# Patient Record
Sex: FEMALE | Race: OTHER | Employment: FULL TIME | URBAN - METROPOLITAN AREA
[De-identification: names, ages, dates, MRNs, and addresses within clinical notes are randomized per-mention and may not be internally consistent; named-entity substitution may affect disease eponyms.]

---

## 2017-03-28 PROBLEM — R01.1 CARDIAC MURMUR: Status: ACTIVE | Noted: 2017-03-28

## 2017-03-28 PROBLEM — J30.9 ALLERGIC RHINITIS: Status: ACTIVE | Noted: 2017-03-28

## 2017-03-28 PROBLEM — Z80.41 FAMILY HISTORY OF MALIGNANT NEOPLASM OF OVARY: Status: ACTIVE | Noted: 2017-03-28

## 2017-03-28 PROBLEM — I10 BENIGN ESSENTIAL HYPERTENSION: Status: ACTIVE | Noted: 2017-03-28

## 2017-03-28 PROBLEM — Z80.3 FAMILY HISTORY OF MALIGNANT NEOPLASM OF BREAST: Status: ACTIVE | Noted: 2017-03-28

## 2017-03-28 PROBLEM — D64.9 ANEMIA: Status: ACTIVE | Noted: 2017-03-28

## 2017-03-28 PROBLEM — J45.909 ASTHMA: Status: ACTIVE | Noted: 2017-03-28

## 2019-10-07 ENCOUNTER — HOSPITAL ENCOUNTER (OUTPATIENT)
Dept: INFUSION THERAPY | Age: 49
Discharge: HOME | End: 2019-10-07
Payer: COMMERCIAL

## 2019-10-07 VITALS
SYSTOLIC BLOOD PRESSURE: 131 MMHG | RESPIRATION RATE: 18 BRPM | DIASTOLIC BLOOD PRESSURE: 82 MMHG | HEART RATE: 78 BPM | TEMPERATURE: 98.2 F | OXYGEN SATURATION: 100 %

## 2019-10-07 PROCEDURE — 96374 THER/PROPH/DIAG INJ IV PUSH: CPT

## 2019-10-07 PROCEDURE — 74011250636 HC RX REV CODE- 250/636: Performed by: OBSTETRICS & GYNECOLOGY

## 2019-10-07 RX ORDER — HYDROCHLOROTHIAZIDE 25 MG/1
25 TABLET ORAL DAILY
COMMUNITY
Start: 2020-01-01

## 2019-10-07 RX ORDER — SODIUM CHLORIDE 0.9 % (FLUSH) 0.9 %
5-10 SYRINGE (ML) INJECTION AS NEEDED
Status: DISCONTINUED | OUTPATIENT
Start: 2019-10-07 | End: 2019-10-11 | Stop reason: HOSPADM

## 2019-10-07 RX ORDER — AMLODIPINE AND OLMESARTAN MEDOXOMIL 5; 40 MG/1; MG/1
5-40 TABLET ORAL DAILY
COMMUNITY
Start: 2020-01-01

## 2019-10-07 RX ADMIN — FERRIC CARBOXYMALTOSE INJECTION 750 MG: 50 INJECTION, SOLUTION INTRAVENOUS at 10:52

## 2019-10-07 RX ADMIN — Medication 10 ML: at 11:08

## 2019-10-07 NOTE — PROGRESS NOTES
[facility] Progress Note Date: 2019 Name: Lauretta Skiff MRN: [id number] : 1970 Injectafer Infusion Ms. Fallon to [facility], ambulatory, at 1000. Pt was assessed and education was provided. Ms. Marco Cerna vitals were reviewed and patient was observed for 5 minutes prior to treatment. Visit Vitals BP (!) 141/92 (BP 1 Location: Left arm, BP Patient Position: Sitting) Pulse 76 Temp 98 °F (36.7 °C) Resp 18 SpO2 100% Breastfeeding? No  
 
 
Patient Vitals for the past 12 hrs: 
 Temp Pulse Resp BP SpO2  
10/07/19 1130 98.2 °F (36.8 °C) 78 18 131/82 100 % 10/07/19 1111 98 °F (36.7 °C) 76 18 (!) 141/92 100 % 10/07/19 1007 98.5 °F (36.9 °C) 88 18 120/83 100 % 24g PIV placed in left hand x 2 attempts by another nursing team member after attempting x1 in right hand. PIV flushed easily and had brisk blood return. Injectafer 750 mg was initiated per IVP with no complications. Patient's vital signs were stable and pt denied complaints of itching, lip/tongue/facial swelling, SOB, CP or other complaints. Ms. Fallon tolerated the infusion, and had no complaints. VS remained stable. PIV flushed with NS 10 ml and removed. No bleeding or hematoma noted at site. Guaze and coban applied. Patient was observed for 30 minutes post treatment with no complaints of any. Reviewed discharge instructions with patient, including expected side effects (abdominal cramping, nausea, changes in color of urine or feces) and signs of allergic reaction requiring medical attention (itching/hives/rashes, SOB, chest pain, lip/tongue/facial swelling). Patient given printed copy to take home. Patient verbalized understanding of discharge instructions. Patient armband was removed and shredded. Ms. Fallon was discharged from [place] [address] in stable condition at 1135. She is to follow up with [facility] on 10/17/19 at 1300 for Injectafer 2of 2.  
 
Kyle Gavin RN 
 October 7, 2019 
12:18 PM

## 2019-10-17 ENCOUNTER — HOSPITAL ENCOUNTER (OUTPATIENT)
Dept: INFUSION THERAPY | Age: 49
Discharge: HOME | End: 2019-10-17
Payer: COMMERCIAL

## 2019-10-17 VITALS
DIASTOLIC BLOOD PRESSURE: 86 MMHG | OXYGEN SATURATION: 98 % | SYSTOLIC BLOOD PRESSURE: 141 MMHG | RESPIRATION RATE: 18 BRPM | HEART RATE: 69 BPM | TEMPERATURE: 98.5 F

## 2019-10-17 PROCEDURE — 74011250636 HC RX REV CODE- 250/636: Performed by: OBSTETRICS & GYNECOLOGY

## 2019-10-17 PROCEDURE — 96374 THER/PROPH/DIAG INJ IV PUSH: CPT

## 2019-10-17 RX ORDER — SODIUM CHLORIDE 0.9 % (FLUSH) 0.9 %
5-10 SYRINGE (ML) INJECTION AS NEEDED
Status: DISCONTINUED | OUTPATIENT
Start: 2019-10-17 | End: 2019-10-21 | Stop reason: HOSPADM

## 2019-10-17 RX ADMIN — FERRIC CARBOXYMALTOSE INJECTION 750 MG: 50 INJECTION, SOLUTION INTRAVENOUS at 13:46

## 2019-10-17 RX ADMIN — Medication 10 ML: at 14:03

## 2019-10-17 NOTE — PROGRESS NOTES
[facility] Progress Note Date: 2019 Name: Lauretta Skiff MRN: [id number] : 1970 Injectafer Infusion Ms. Fallon to [facility], [facility], at 1308. Pt was assessed and education was provided. Ms. Fallon stated she was hospitalized and transfused with 2 units of blood on 10/8/2019 after her first injecafer infusion on 10/7/2019. She states she will be having surgery (hysterectomy) at the end of this month due to heavy bleeding. Patient c/o lower abdominal discomfort and is taking tylenol for pain. Ms. Marco Cerna vitals were reviewed and patient was observed for 5 minutes prior to treatment. Visit Vitals /86 (BP 1 Location: Right arm, BP Patient Position: Sitting) Pulse 69 Temp 98.5 °F (36.9 °C) Resp 18 SpO2 98% Patient Vitals for the past 12 hrs: 
 Temp Pulse Resp BP SpO2  
10/17/19 1408 98.5 °F (36.9 °C) 69 18 141/86 98 % 10/17/19 1332 98.2 °F (36.8 °C) 75 18 (!) 141/94 99 % 24g PIV placed in left wrist x 1 attempt, patient tolerated well. PIV flushed easily and had brisk blood return. Injectafer 750 mg was initiated per IVP with no complications. Patient's vital signs were stable and pt denied complaints of itching, lip/tongue/facial swelling, SOB, CP or other complaints. Ms. Fallon tolerated the infusion, and had no complaints. VS remained stable. PIV flushed with NS 10 ml and removed. No bleeding or hematoma noted at site. Guaze and coban applied. Patient was observed for 30 minutes post treatment with no complaints of any. Reviewed discharge instructions with patient, including expected side effects (abdominal cramping, nausea, changes in color of urine or feces) and signs of allergic reaction requiring medical attention (itching/hives/rashes, SOB, chest pain, lip/tongue/facial swelling). Patient given printed copy to take home. Patient verbalized understanding of discharge instructions. Patient armband was removed and shredded. Ms. Fallon was discharged from [place] [address] in stable condition at 1430. She has no further appointments with [facility] at this time. She is to follow up with Dr. Agustin Quach as instructed. Kyle Gavin RN October 17, 2019 
12:18 PM

## 2020-01-01 ENCOUNTER — HOME CARE VISIT (OUTPATIENT)
Dept: SCHEDULING | Facility: HOME HEALTH | Age: 50
End: 2020-01-01
Payer: COMMERCIAL

## 2020-01-01 ENCOUNTER — HOME CARE VISIT (OUTPATIENT)
Dept: HOSPICE | Facility: HOSPICE | Age: 50
End: 2020-01-01
Payer: COMMERCIAL

## 2020-01-01 ENCOUNTER — HOSPICE ADMISSION (OUTPATIENT)
Dept: HOSPICE | Facility: HOSPICE | Age: 50
End: 2020-01-01
Payer: COMMERCIAL

## 2020-01-01 VITALS
DIASTOLIC BLOOD PRESSURE: 92 MMHG | OXYGEN SATURATION: 100 % | WEIGHT: 98 LBS | HEART RATE: 110 BPM | BODY MASS INDEX: 19.24 KG/M2 | HEIGHT: 60 IN | SYSTOLIC BLOOD PRESSURE: 120 MMHG | RESPIRATION RATE: 16 BRPM

## 2020-01-01 VITALS
RESPIRATION RATE: 16 BRPM | SYSTOLIC BLOOD PRESSURE: 138 MMHG | HEART RATE: 112 BPM | TEMPERATURE: 98.8 F | DIASTOLIC BLOOD PRESSURE: 72 MMHG | OXYGEN SATURATION: 99 %

## 2020-01-01 VITALS
RESPIRATION RATE: 20 BRPM | OXYGEN SATURATION: 99 % | SYSTOLIC BLOOD PRESSURE: 140 MMHG | DIASTOLIC BLOOD PRESSURE: 99 MMHG | HEART RATE: 115 BPM

## 2020-01-01 VITALS
HEART RATE: 116 BPM | RESPIRATION RATE: 18 BRPM | TEMPERATURE: 98.8 F | SYSTOLIC BLOOD PRESSURE: 144 MMHG | DIASTOLIC BLOOD PRESSURE: 88 MMHG | OXYGEN SATURATION: 100 %

## 2020-01-01 VITALS
OXYGEN SATURATION: 100 % | SYSTOLIC BLOOD PRESSURE: 140 MMHG | HEART RATE: 99 BPM | DIASTOLIC BLOOD PRESSURE: 90 MMHG | TEMPERATURE: 97.5 F

## 2020-01-01 VITALS
OXYGEN SATURATION: 100 % | TEMPERATURE: 98.2 F | SYSTOLIC BLOOD PRESSURE: 138 MMHG | RESPIRATION RATE: 16 BRPM | HEART RATE: 94 BPM | DIASTOLIC BLOOD PRESSURE: 88 MMHG

## 2020-01-01 PROCEDURE — G0299 HHS/HOSPICE OF RN EA 15 MIN: HCPCS

## 2020-01-01 PROCEDURE — 0651 HSPC ROUTINE HOME CARE

## 2020-01-01 PROCEDURE — HOSPICE MEDICATION HC HH HOSPICE MEDICATION

## 2020-01-01 PROCEDURE — G0155 HHCP-SVS OF CSW,EA 15 MIN: HCPCS

## 2020-01-01 PROCEDURE — 3336500001 HSPC ELECTION

## 2021-01-01 ENCOUNTER — HOME CARE VISIT (OUTPATIENT)
Dept: SCHEDULING | Facility: HOME HEALTH | Age: 51
End: 2021-01-01
Payer: COMMERCIAL

## 2021-01-01 ENCOUNTER — HOME CARE VISIT (OUTPATIENT)
Dept: HOSPICE | Facility: HOSPICE | Age: 51
End: 2021-01-01
Payer: COMMERCIAL

## 2021-01-01 ENCOUNTER — HOSPITAL ENCOUNTER (INPATIENT)
Age: 51
LOS: 2 days | DRG: 951 | End: 2021-01-15
Attending: INTERNAL MEDICINE | Admitting: INTERNAL MEDICINE
Payer: OTHER MISCELLANEOUS

## 2021-01-01 VITALS
OXYGEN SATURATION: 94 % | DIASTOLIC BLOOD PRESSURE: 88 MMHG | RESPIRATION RATE: 20 BRPM | HEART RATE: 97 BPM | SYSTOLIC BLOOD PRESSURE: 144 MMHG | TEMPERATURE: 98.2 F

## 2021-01-01 VITALS
RESPIRATION RATE: 25 BRPM | HEART RATE: 108 BPM | OXYGEN SATURATION: 89 % | DIASTOLIC BLOOD PRESSURE: 85 MMHG | SYSTOLIC BLOOD PRESSURE: 114 MMHG | TEMPERATURE: 96.5 F

## 2021-01-01 VITALS
SYSTOLIC BLOOD PRESSURE: 146 MMHG | OXYGEN SATURATION: 94 % | HEART RATE: 116 BPM | RESPIRATION RATE: 18 BRPM | TEMPERATURE: 98.3 F | DIASTOLIC BLOOD PRESSURE: 84 MMHG

## 2021-01-01 VITALS — OXYGEN SATURATION: 95 % | RESPIRATION RATE: 26 BRPM | HEART RATE: 120 BPM | TEMPERATURE: 98.1 F

## 2021-01-01 VITALS
RESPIRATION RATE: 18 BRPM | OXYGEN SATURATION: 98 % | SYSTOLIC BLOOD PRESSURE: 128 MMHG | TEMPERATURE: 97.1 F | DIASTOLIC BLOOD PRESSURE: 78 MMHG | HEART RATE: 104 BPM

## 2021-01-01 VITALS
OXYGEN SATURATION: 99 % | RESPIRATION RATE: 20 BRPM | SYSTOLIC BLOOD PRESSURE: 137 MMHG | DIASTOLIC BLOOD PRESSURE: 90 MMHG | HEART RATE: 101 BPM

## 2021-01-01 VITALS
DIASTOLIC BLOOD PRESSURE: 84 MMHG | HEART RATE: 105 BPM | SYSTOLIC BLOOD PRESSURE: 130 MMHG | TEMPERATURE: 98.5 F | OXYGEN SATURATION: 95 % | RESPIRATION RATE: 16 BRPM

## 2021-01-01 VITALS
RESPIRATION RATE: 16 BRPM | DIASTOLIC BLOOD PRESSURE: 84 MMHG | OXYGEN SATURATION: 100 % | TEMPERATURE: 98.6 F | HEART RATE: 106 BPM | SYSTOLIC BLOOD PRESSURE: 138 MMHG

## 2021-01-01 DIAGNOSIS — Z51.5 HOSPICE CARE PATIENT: ICD-10-CM

## 2021-01-01 DIAGNOSIS — R06.4 LABORED BREATHING: ICD-10-CM

## 2021-01-01 DIAGNOSIS — C53.9 MALIGNANT NEOPLASM OF CERVIX, UNSPECIFIED SITE (HCC): ICD-10-CM

## 2021-01-01 DIAGNOSIS — R06.2 WHEEZING: ICD-10-CM

## 2021-01-01 DIAGNOSIS — R53.0 NEOPLASTIC (MALIGNANT) RELATED FATIGUE: ICD-10-CM

## 2021-01-01 DIAGNOSIS — F41.9 ANXIETY: ICD-10-CM

## 2021-01-01 DIAGNOSIS — R45.1 RESTLESSNESS: ICD-10-CM

## 2021-01-01 DIAGNOSIS — R11.0 NAUSEA: ICD-10-CM

## 2021-01-01 PROCEDURE — 94640 AIRWAY INHALATION TREATMENT: CPT

## 2021-01-01 PROCEDURE — 65270000029 HC RM PRIVATE

## 2021-01-01 PROCEDURE — 2709999900 HC NON-CHARGEABLE SUPPLY

## 2021-01-01 PROCEDURE — HOSPICE MEDICATION HC HH HOSPICE MEDICATION

## 2021-01-01 PROCEDURE — G0299 HHS/HOSPICE OF RN EA 15 MIN: HCPCS

## 2021-01-01 PROCEDURE — 74011250636 HC RX REV CODE- 250/636: Performed by: INTERNAL MEDICINE

## 2021-01-01 PROCEDURE — 74011250637 HC RX REV CODE- 250/637: Performed by: INTERNAL MEDICINE

## 2021-01-01 PROCEDURE — G0300 HHS/HOSPICE OF LPN EA 15 MIN: HCPCS

## 2021-01-01 PROCEDURE — 0651 HSPC ROUTINE HOME CARE

## 2021-01-01 PROCEDURE — 94760 N-INVAS EAR/PLS OXIMETRY 1: CPT

## 2021-01-01 PROCEDURE — 74011000250 HC RX REV CODE- 250: Performed by: INTERNAL MEDICINE

## 2021-01-01 PROCEDURE — 99223 1ST HOSP IP/OBS HIGH 75: CPT | Performed by: INTERNAL MEDICINE

## 2021-01-01 PROCEDURE — 0655 HSPC INPATIENT RESPITE

## 2021-01-01 PROCEDURE — T4535 DISPOSABLE LINER/SHIELD/PAD: HCPCS

## 2021-01-01 PROCEDURE — 99233 SBSQ HOSP IP/OBS HIGH 50: CPT | Performed by: INTERNAL MEDICINE

## 2021-01-01 PROCEDURE — 0656 HSPC GENERAL INPATIENT

## 2021-01-01 PROCEDURE — 77030003560 HC NDL HUBR BARD -A

## 2021-01-01 PROCEDURE — 77010033678 HC OXYGEN DAILY

## 2021-01-01 PROCEDURE — G0155 HHCP-SVS OF CSW,EA 15 MIN: HCPCS

## 2021-01-01 RX ORDER — FACIAL-BODY WIPES
10 EACH TOPICAL DAILY PRN
Status: DISCONTINUED | OUTPATIENT
Start: 2021-01-01 | End: 2021-01-16 | Stop reason: HOSPADM

## 2021-01-01 RX ORDER — ACETAMINOPHEN 650 MG/1
650 SUPPOSITORY RECTAL
Status: DISCONTINUED | OUTPATIENT
Start: 2021-01-01 | End: 2021-01-16 | Stop reason: HOSPADM

## 2021-01-01 RX ORDER — MORPHINE SULFATE 4 MG/ML
3 INJECTION INTRAVENOUS EVERY 4 HOURS
Status: DISCONTINUED | OUTPATIENT
Start: 2021-01-01 | End: 2021-01-01

## 2021-01-01 RX ORDER — MORPHINE SULFATE 100 MG/5ML
5 SOLUTION ORAL
Status: DISCONTINUED | OUTPATIENT
Start: 2021-01-01 | End: 2021-01-01

## 2021-01-01 RX ORDER — ONDANSETRON 4 MG/1
4 TABLET, ORALLY DISINTEGRATING ORAL
Status: DISCONTINUED | OUTPATIENT
Start: 2021-01-01 | End: 2021-01-01

## 2021-01-01 RX ORDER — DEXAMETHASONE 0.5 MG/1
2 TABLET ORAL DAILY
Status: DISCONTINUED | OUTPATIENT
Start: 2021-01-01 | End: 2021-01-01

## 2021-01-01 RX ORDER — ONDANSETRON 2 MG/ML
4 INJECTION INTRAMUSCULAR; INTRAVENOUS
Status: DISCONTINUED | OUTPATIENT
Start: 2021-01-01 | End: 2021-01-16 | Stop reason: HOSPADM

## 2021-01-01 RX ORDER — ACETAMINOPHEN 650 MG/1
650 SUPPOSITORY RECTAL
Status: DISCONTINUED | OUTPATIENT
Start: 2021-01-01 | End: 2021-01-01 | Stop reason: SDUPTHER

## 2021-01-01 RX ORDER — LOSARTAN POTASSIUM 50 MG/1
50 TABLET ORAL DAILY
Status: DISCONTINUED | OUTPATIENT
Start: 2021-01-01 | End: 2021-01-01

## 2021-01-01 RX ORDER — SODIUM CHLORIDE 0.9 % (FLUSH) 0.9 %
5-10 SYRINGE (ML) INJECTION AS NEEDED
Status: DISCONTINUED | OUTPATIENT
Start: 2021-01-01 | End: 2021-01-16 | Stop reason: HOSPADM

## 2021-01-01 RX ORDER — DEXAMETHASONE 0.5 MG/1
2 TABLET ORAL DAILY
Status: DISCONTINUED | OUTPATIENT
Start: 2021-01-16 | End: 2021-01-01

## 2021-01-01 RX ORDER — METHADONE HYDROCHLORIDE 10 MG/1
10 TABLET ORAL 2 TIMES DAILY
Status: DISCONTINUED | OUTPATIENT
Start: 2021-01-01 | End: 2021-01-01

## 2021-01-01 RX ORDER — HYDROCHLOROTHIAZIDE 25 MG/1
25 TABLET ORAL DAILY
Status: DISCONTINUED | OUTPATIENT
Start: 2021-01-01 | End: 2021-01-01

## 2021-01-01 RX ORDER — ALBUTEROL SULFATE 0.83 MG/ML
2.5 SOLUTION RESPIRATORY (INHALATION)
Status: DISCONTINUED | OUTPATIENT
Start: 2021-01-01 | End: 2021-01-01

## 2021-01-01 RX ORDER — LORAZEPAM 2 MG/ML
0.5 INJECTION INTRAMUSCULAR
Status: DISCONTINUED | OUTPATIENT
Start: 2021-01-01 | End: 2021-01-16 | Stop reason: HOSPADM

## 2021-01-01 RX ORDER — OXYCODONE AND ACETAMINOPHEN 5; 325 MG/1; MG/1
1 TABLET ORAL
Status: DISCONTINUED | OUTPATIENT
Start: 2021-01-01 | End: 2021-01-01

## 2021-01-01 RX ORDER — ONDANSETRON 4 MG/1
4 TABLET, ORALLY DISINTEGRATING ORAL EVERY 6 HOURS
Status: DISCONTINUED | OUTPATIENT
Start: 2021-01-01 | End: 2021-01-01

## 2021-01-01 RX ORDER — ALBUTEROL SULFATE 2.5 MG/.5ML
SOLUTION RESPIRATORY (INHALATION)
Status: DISPENSED
Start: 2021-01-01 | End: 2021-01-01

## 2021-01-01 RX ORDER — MORPHINE SULFATE 100 MG/5ML
5 SOLUTION ORAL EVERY 6 HOURS
Status: DISCONTINUED | OUTPATIENT
Start: 2021-01-01 | End: 2021-01-01

## 2021-01-01 RX ORDER — AMLODIPINE BESYLATE 5 MG/1
10 TABLET ORAL DAILY
Status: DISCONTINUED | OUTPATIENT
Start: 2021-01-01 | End: 2021-01-01

## 2021-01-01 RX ORDER — HALOPERIDOL 2 MG/ML
2 SOLUTION ORAL
Status: DISCONTINUED | OUTPATIENT
Start: 2021-01-01 | End: 2021-01-01

## 2021-01-01 RX ORDER — POLYETHYLENE GLYCOL 3350 17 G/17G
17 POWDER, FOR SOLUTION ORAL DAILY
Status: DISCONTINUED | OUTPATIENT
Start: 2021-01-01 | End: 2021-01-01

## 2021-01-01 RX ORDER — AMOXICILLIN 250 MG
1 CAPSULE ORAL DAILY
Status: DISCONTINUED | OUTPATIENT
Start: 2021-01-01 | End: 2021-01-01

## 2021-01-01 RX ORDER — ACETAMINOPHEN 325 MG/1
650 TABLET ORAL
Status: DISCONTINUED | OUTPATIENT
Start: 2021-01-01 | End: 2021-01-01

## 2021-01-01 RX ORDER — GLYCOPYRROLATE 0.2 MG/ML
0.2 INJECTION INTRAMUSCULAR; INTRAVENOUS
Status: DISCONTINUED | OUTPATIENT
Start: 2021-01-01 | End: 2021-01-16 | Stop reason: HOSPADM

## 2021-01-01 RX ORDER — LORAZEPAM 0.5 MG/1
0.5 TABLET ORAL
Status: DISCONTINUED | OUTPATIENT
Start: 2021-01-01 | End: 2021-01-01

## 2021-01-01 RX ORDER — MORPHINE SULFATE 2 MG/ML
2 INJECTION, SOLUTION INTRAMUSCULAR; INTRAVENOUS
Status: DISCONTINUED | OUTPATIENT
Start: 2021-01-01 | End: 2021-01-01

## 2021-01-01 RX ORDER — MORPHINE SULFATE 4 MG/ML
4 INJECTION INTRAVENOUS EVERY 4 HOURS
Status: DISCONTINUED | OUTPATIENT
Start: 2021-01-01 | End: 2021-01-16 | Stop reason: HOSPADM

## 2021-01-01 RX ORDER — LORAZEPAM 0.5 MG/1
0.5 TABLET ORAL
Status: DISCONTINUED | OUTPATIENT
Start: 2021-01-01 | End: 2021-01-01 | Stop reason: SDUPTHER

## 2021-01-01 RX ORDER — LORAZEPAM 2 MG/ML
0.5 INJECTION INTRAMUSCULAR EVERY 4 HOURS
Status: DISCONTINUED | OUTPATIENT
Start: 2021-01-01 | End: 2021-01-16 | Stop reason: HOSPADM

## 2021-01-01 RX ORDER — HYOSCYAMINE SULFATE 0.12 MG/1
0.12 TABLET SUBLINGUAL
Status: DISCONTINUED | OUTPATIENT
Start: 2021-01-01 | End: 2021-01-01

## 2021-01-01 RX ORDER — PROCHLORPERAZINE MALEATE 5 MG
10 TABLET ORAL
Status: DISCONTINUED | OUTPATIENT
Start: 2021-01-01 | End: 2021-01-01

## 2021-01-01 RX ORDER — ACETAMINOPHEN 325 MG/1
650 TABLET ORAL
Status: DISCONTINUED | OUTPATIENT
Start: 2021-01-01 | End: 2021-01-01 | Stop reason: SDUPTHER

## 2021-01-01 RX ORDER — LANOLIN ALCOHOL/MO/W.PET/CERES
3 CREAM (GRAM) TOPICAL
Status: DISCONTINUED | OUTPATIENT
Start: 2021-01-01 | End: 2021-01-01

## 2021-01-01 RX ORDER — MORPHINE SULFATE 4 MG/ML
4 INJECTION INTRAVENOUS
Status: DISCONTINUED | OUTPATIENT
Start: 2021-01-01 | End: 2021-01-16 | Stop reason: HOSPADM

## 2021-01-01 RX ORDER — MORPHINE SULFATE 2 MG/ML
2 INJECTION, SOLUTION INTRAMUSCULAR; INTRAVENOUS EVERY 4 HOURS
Status: DISCONTINUED | OUTPATIENT
Start: 2021-01-01 | End: 2021-01-01

## 2021-01-01 RX ORDER — DEXAMETHASONE 4 MG/1
4 TABLET ORAL DAILY
Status: DISCONTINUED | OUTPATIENT
Start: 2021-01-01 | End: 2021-01-01

## 2021-01-01 RX ADMIN — POLYETHYLENE GLYCOL 3350 17 G: 17 POWDER, FOR SOLUTION ORAL at 09:48

## 2021-01-01 RX ADMIN — HYDROCHLOROTHIAZIDE 25 MG: 25 TABLET ORAL at 08:03

## 2021-01-01 RX ADMIN — MORPHINE SULFATE 2 MG: 2 INJECTION, SOLUTION INTRAMUSCULAR; INTRAVENOUS at 18:39

## 2021-01-01 RX ADMIN — MORPHINE SULFATE 5 MG: 100 SOLUTION ORAL at 23:11

## 2021-01-01 RX ADMIN — METHADONE HYDROCHLORIDE 10 MG: 10 TABLET ORAL at 17:43

## 2021-01-01 RX ADMIN — MORPHINE SULFATE 2 MG: 2 INJECTION, SOLUTION INTRAMUSCULAR; INTRAVENOUS at 00:50

## 2021-01-01 RX ADMIN — LORAZEPAM 0.5 MG: 0.5 TABLET ORAL at 14:12

## 2021-01-01 RX ADMIN — LOSARTAN POTASSIUM 50 MG: 50 TABLET ORAL at 09:44

## 2021-01-01 RX ADMIN — MORPHINE SULFATE 2 MG: 2 INJECTION, SOLUTION INTRAMUSCULAR; INTRAVENOUS at 16:42

## 2021-01-01 RX ADMIN — MORPHINE SULFATE 2 MG: 2 INJECTION, SOLUTION INTRAMUSCULAR; INTRAVENOUS at 08:48

## 2021-01-01 RX ADMIN — Medication 3 MG: at 05:35

## 2021-01-01 RX ADMIN — Medication 10 ML: at 13:08

## 2021-01-01 RX ADMIN — ONDANSETRON 4 MG: 4 TABLET, ORALLY DISINTEGRATING ORAL at 13:08

## 2021-01-01 RX ADMIN — LORAZEPAM 0.5 MG: 2 INJECTION INTRAMUSCULAR; INTRAVENOUS at 12:59

## 2021-01-01 RX ADMIN — DEXAMETHASONE 4 MG: 4 TABLET ORAL at 09:44

## 2021-01-01 RX ADMIN — MORPHINE SULFATE 3 MG: 4 INJECTION INTRAVENOUS at 12:59

## 2021-01-01 RX ADMIN — LORAZEPAM 0.5 MG: 2 INJECTION INTRAMUSCULAR; INTRAVENOUS at 13:28

## 2021-01-01 RX ADMIN — HYOSCYAMINE SULFATE 0.12 MG: 0.12 TABLET ORAL; SUBLINGUAL at 04:08

## 2021-01-01 RX ADMIN — DOCUSATE SODIUM 50MG AND SENNOSIDES 8.6MG 1 TABLET: 8.6; 5 TABLET, FILM COATED ORAL at 09:44

## 2021-01-01 RX ADMIN — PROCHLORPERAZINE MALEATE 10 MG: 5 TABLET ORAL at 03:43

## 2021-01-01 RX ADMIN — METHYLPREDNISOLONE SODIUM SUCCINATE 60 MG: 125 INJECTION, POWDER, FOR SOLUTION INTRAMUSCULAR; INTRAVENOUS at 08:48

## 2021-01-01 RX ADMIN — ALBUTEROL SULFATE 2.5 MG: 2.5 SOLUTION RESPIRATORY (INHALATION) at 11:28

## 2021-01-01 RX ADMIN — AMLODIPINE BESYLATE 10 MG: 5 TABLET ORAL at 09:44

## 2021-01-01 RX ADMIN — LORAZEPAM 0.5 MG: 2 INJECTION INTRAMUSCULAR; INTRAVENOUS at 05:07

## 2021-01-01 RX ADMIN — LORAZEPAM 0.5 MG: 2 INJECTION INTRAMUSCULAR; INTRAVENOUS at 00:50

## 2021-01-01 RX ADMIN — MORPHINE SULFATE 5 MG: 100 SOLUTION ORAL at 11:53

## 2021-01-01 RX ADMIN — MORPHINE SULFATE 2 MG: 2 INJECTION, SOLUTION INTRAMUSCULAR; INTRAVENOUS at 05:07

## 2021-01-01 RX ADMIN — METHADONE HYDROCHLORIDE 10 MG: 10 TABLET ORAL at 08:04

## 2021-01-01 RX ADMIN — METHYLPREDNISOLONE SODIUM SUCCINATE 120 MG: 40 INJECTION, POWDER, FOR SOLUTION INTRAMUSCULAR; INTRAVENOUS at 13:08

## 2021-01-01 RX ADMIN — MORPHINE SULFATE 5 MG: 100 SOLUTION ORAL at 03:42

## 2021-01-01 RX ADMIN — GLYCOPYRROLATE 0.2 MG: 0.2 INJECTION, SOLUTION INTRAMUSCULAR; INTRAVENOUS at 13:29

## 2021-01-01 RX ADMIN — LORAZEPAM 0.5 MG: 2 INJECTION INTRAMUSCULAR; INTRAVENOUS at 16:56

## 2021-01-01 RX ADMIN — MORPHINE SULFATE 2 MG: 2 INJECTION, SOLUTION INTRAMUSCULAR; INTRAVENOUS at 07:48

## 2021-01-01 RX ADMIN — LORAZEPAM 0.5 MG: 2 INJECTION INTRAMUSCULAR; INTRAVENOUS at 08:48

## 2021-01-01 RX ADMIN — MORPHINE SULFATE 4 MG: 4 INJECTION INTRAVENOUS at 16:56

## 2021-01-01 RX ADMIN — ALBUTEROL SULFATE 2.5 MG: 2.5 SOLUTION RESPIRATORY (INHALATION) at 14:22

## 2021-01-01 RX ADMIN — MORPHINE SULFATE 2 MG: 2 INJECTION, SOLUTION INTRAMUSCULAR; INTRAVENOUS at 21:05

## 2021-01-01 RX ADMIN — PROCHLORPERAZINE MALEATE 10 MG: 5 TABLET ORAL at 23:11

## 2021-01-01 RX ADMIN — LORAZEPAM 0.5 MG: 2 INJECTION INTRAMUSCULAR; INTRAVENOUS at 21:04

## 2021-01-13 PROBLEM — C53.9 CERVICAL CANCER (HCC): Status: ACTIVE | Noted: 2021-01-01

## 2021-01-13 NOTE — HOSPICE
[facility]  [phone]    Respite Nursing Note   Patient Name: Elsa German  YOB: 1970  Age: 53 y.o. Date of Visit: 01/13/21  Facility of Care: [facility]  Patient Room: [address] [place] [address] Attending: Cadence Lee MD  Hospice Diagnosis: Cervical cancer [facility]) [C53.9]    Level of Care: Respite    ASSESSMENT & PLAN     1. Patient admitted to Respite level of care due to:    [] Caregiver Exhaustion:    [x] Caregiver Breakdown: patient does not have 24/7 caregivers, confusion and safety issues s/p fall- not safe to be left alone  2. Administer home medications as ordered including scheduled methadone and steroid taper     3. Patient having severe SOB at home and on admission- currently on 6L o2, PRN nebulizer ordered for wheezing, PRN roxanol ordered for dyspnea  4. Monitor for confusion/restlessness and agitation. Administer ordered ativan as needed  5. Provide support to patient at bedside, patient is confused and anxious; reorient frequently, bed alarm in place    Nursing Interventions: monitor patient closely, encourage PRN medications, titrate medications as needed for effective symptom relief    Spiritual Interventions:  visits as indicated    Psych/ Social/ Emotional Interventions: reorient to hospital frequently. Provide updates to family/friends as needed    Care Coordination Needs: will more than likely need 24/7 care at [place], [facility] approved. [facility] to be completed at the hospital    Care Plan and New Orders Discussed / Approved with Dr. Agnieszka Caceres MD.    Description History and Chart Review     List number of doses of PRN medications in last 24 hours:  Medication 1: Ativan  Number of doses: 1    Medication 2:  Albuerol nebulizer  Number of doses: 1    Medication 3:   Number of doses:    DISCHARGE PLANNING     1. Discharge Plan: home once respite stay has ended or to LTC if needed   2. Patient/Family teaching: respite stay   3.  Response to patient/family teaching:  Family receptive, patient unable to verbalize understanding     ASSESSMENT    KARNOFSKY: 30      Prognosis estimated based on 01/13/21 clinical assessment is:   [] Few to Many Hours  [] Hours to Days   [] Few to Many Days   [] Days to Weeks   [] Few to Many Weeks   [x] Weeks to Months   [] Few to Many Months    Quality Measure: Patient self-reports:  [x]  Yes   []  No    ESAS:   Time of Assessment: 1700  Pain (1-10):2  Fatigue (1-10): 6  Shortness of breath (1-10):8  Nausea (1-10): 0  Appetite (1-10): 8 ( states she is very hungry)   Anxiety: (1-10):   Depression: (1-10):   Well-being: (1-10):   Constipation: _ Yes  x_ No    CLINICAL INFORMATION   No data found.     Currently this patient has:  [x] Supplemental O2   [] IV    [] PICC      [] PORT   [] NG Tube    [] PEG Tube   [] Ostomy     [] Rivas draining _______ urine  [] Other    SIGNS/PHYSICAL FINDINGS     Skin:  [x] Warm, dry, supple, intact and color normal for race  [] Warm   [] Dry   [] Cool     [] Clammy       [] Diaphoretic    Turgor   [] Normal   [x] Decreased  Color:   [] Pink   [x] Pale   [] Cyanotic   [] Erythema   [] Jaundice   [] Normal for Race  []  Wounds:    Neuro:  [] Lethargy  [x] Restlessness / agitation  [x] Confusion / delirium  [] Hallucinations  [] Responds to maximal stimulation  [] Unresponsive  [] Seizures     Cardiac:  [] Dyspnea on Exertion  [] JVD  [] Murmur  [] Palpitations  [] Hypotension  [] Hypertension  [x] Tachycardia  [] Bradycardia  [] Irregular HR  [] Pulses Decreased  [] Pulses Absent  [x] Edema:   3+ edema to bilateral extremities     (Location, Grade and Pitting)  [] Mottling:      (Location)    Respiratory:  Breath sounds:    [] Diminished   [x] Wheeze   [] Rhonchi   [] Rales   [] Even and unlabored  [x] Labored:   Very dyspneic both at rest and on exertion         [x] Cough   [x] Non Productive   [] Productive    [] Description:           [] Deep suctioned   [x] O2 at _6__ LPM  [] High flow oxygen greater than 10 LPM  [] Bi-Pap    GI:  [] Abdomen (describe)   [] Ascites  [] Nausea  [] Vomiting  [] Incontinent of bowels  [x] Bowel sounds (yes/no)  [] Diarrhea  [] Constipation (see above including last bowel movement)  [] Checked for impaction  [] Last BM       Nutrition  Diet:__regular________  Appetite:   [] Good   [x] Fair   [] Poor   [] Tube Feeding     :  [x] Voiding  [] Incontinent   [] Rivas    Musculoskeletal  [x] Balance/[place] Unsteady   [x] Weak   Strength:    [] Normal    [x] Limited    [] Decreasing   Activities:    [] Up as tolerated   [] Bedridden    [x] Specify: w/ stand by assistance    SAFETY  [] 24 hr. Caregiver   [x] Side rails ? [x] Hospital bed   [x] Reviewed Falls & Safety     ALLERGIES AND MEDICATIONS     Allergies:    Allergies   Allergen Reactions    Shrimp Anaphylaxis    Duloxetine Drowsiness and Palpitations     racing heartbeat, decreased level of alertness    Hydromorphone Other (comments)     vomiting     Morphine Other (comments)     vomiting  1/8/21:  Morphine on MAR and in pt home - plan is to administer anti-nausea medication prior to morphine if pt requires use of morphine per Dr. Johnny Monsivais          Current Facility-Administered Medications   Medication Dose Route Frequency    haloperidol (HALDOL) 2 mg/mL oral solution 2 mg  2 mg SubLINGual Q6H PRN    ondansetron (ZOFRAN ODT) tablet 4 mg  4 mg Oral Q6H PRN    LORazepam (ATIVAN) tablet 0.5 mg  0.5 mg Oral Q6H PRN    acetaminophen (TYLENOL) tablet 650 mg  650 mg Oral Q4H PRN    Or    acetaminophen (TYLENOL) solution 650 mg  650 mg Oral Q4H PRN    Or    acetaminophen (TYLENOL) suppository 650 mg  650 mg Rectal Q4H PRN    hyoscyamine SL (LEVSIN/SL) tablet 0.125 mg  0.125 mg SubLINGual Q4H PRN    bisacodyL (DULCOLAX) suppository 10 mg  10 mg Rectal DAILY PRN    albuterol (PROVENTIL VENTOLIN) nebulizer solution 2.5 mg  2.5 mg Nebulization Q4H PRN    morphine (ROXANOL) 100 mg/5 mL (20 mg/mL) concentrated solution 5 mg  5 mg Oral Q3H PRN    [START ON 1/14/2021] senna-docusate (PERICOLACE) 8.6-50 mg per tablet 1 Tab  1 Tab Oral DAILY    [START ON 1/14/2021] polyethylene glycol (MIRALAX) packet 17 g  17 g Oral DAILY    prochlorperazine (COMPAZINE) tablet 10 mg  10 mg Oral Q6H PRN    methadone (DOLOPHINE) tablet 10 mg  10 mg Oral BID    [START ON 1/14/2021] dexAMETHasone (DECADRON) tablet 4 mg  4 mg Oral DAILY    [START ON 1/14/2021] amLODIPine (NORVASC) tablet 10 mg  10 mg Oral DAILY    [START ON 1/14/2021] losartan (COZAAR) tablet 50 mg  50 mg Oral DAILY    melatonin tablet 3 mg  3 mg Oral QHS PRN    [START ON 1/14/2021] hydroCHLOROthiazide (HYDRODIURIL) tablet 25 mg  25 mg Oral DAILY    [START ON 1/16/2021] dexAMETHasone (DECADRON) tablet 2 mg  2 mg Oral DAILY

## 2021-01-13 NOTE — PROGRESS NOTES
Problem: Potential for Skin Breakdown  Goal: Demonstrate ability to care for skin, monitor areas of breakdown and demonstrate methods to prevent breakdown  Description: Patient/family/caregiver will demonstrate ability to care for patient's skin, monitor for areas of breakdown, and demonstrate methods to prevent breakdown during hospice care. Outcome: Progressing Towards Goal     Problem: Risk for Falls  Goal: Free of falls during episode of care  Description: Patient will be free of falls during episode of care. Outcome: Progressing Towards Goal     Problem: Alteration in Mobility  Goal: Remain as independent as possible and remain safe in environment  Description: Patient will remain as independent as possible and remain safe in their environment. Outcome: Progressing Towards Goal     Problem: Comfort Deficit  Goal: Reduce/control pain  Description: Patient will report that pain has been reduced or controlled through verbal and nonverbal means and that measures to promote comfort are effective. Outcome: Progressing Towards Goal     Problem: Anxiety/Agitation  Goal: Verbalize and demonstrate ability to manage anxiety  Description: The patient/family/caregiver will verbalize and demonstrate ability to manage the patient's anxiety throughout hospice care. Outcome: Progressing Towards Goal     Problem: Communication Deficit  Goal: Effectively communicate symptoms, needs, and concerns  Description: Patient/family/caregiver will effectively communicate symptoms, needs and concerns. Outcome: Progressing Towards Goal     Problem: End of Life Process  Goal: Demonstrate understanding of end of life processes  Description: Patient/caregiver will understand end of life processes.   Outcome: Progressing Towards Goal     Problem: Dyspnea Due to End of Life  Goal: Demonstrate understanding of and ability to manage respiratory symptoms at end of life  Outcome: Progressing Towards Goal

## 2021-01-14 NOTE — PROGRESS NOTES
9922  Patient looked very uncomfortable with increased work of breathing and crackling in lungs. I administered morphine (with compazine right before per MD order to prevent nausea) and levsin to help reduce work of breathing and reduce secretions.

## 2021-01-14 NOTE — PROGRESS NOTES
Problem: Potential for Skin Breakdown  Goal: Demonstrate ability to care for skin, monitor areas of breakdown and demonstrate methods to prevent breakdown  Description: Patient/family/caregiver will demonstrate ability to care for patient's skin, monitor for areas of breakdown, and demonstrate methods to prevent breakdown during hospice care. Outcome: Progressing Towards Goal     Problem: Risk for Falls  Goal: Free of falls during episode of care  Description: Patient will be free of falls during episode of care. Outcome: Progressing Towards Goal     Problem: Alteration in Mobility  Goal: Remain as independent as possible and remain safe in environment  Description: Patient will remain as independent as possible and remain safe in their environment. Outcome: Progressing Towards Goal     Problem: Comfort Deficit  Goal: Reduce/control pain  Description: Patient will report that pain has been reduced or controlled through verbal and nonverbal means and that measures to promote comfort are effective. Outcome: Progressing Towards Goal     Problem: Anxiety/Agitation  Goal: Verbalize and demonstrate ability to manage anxiety  Description: The patient/family/caregiver will verbalize and demonstrate ability to manage the patient's anxiety throughout hospice care. Outcome: Progressing Towards Goal     Problem: Communication Deficit  Goal: Effectively communicate symptoms, needs, and concerns  Description: Patient/family/caregiver will effectively communicate symptoms, needs and concerns. Outcome: Progressing Towards Goal     Problem: End of Life Process  Goal: Demonstrate understanding of end of life processes  Description: Patient/caregiver will understand end of life processes.   Outcome: Progressing Towards Goal     Problem: Dyspnea Due to End of Life  Goal: Demonstrate understanding of and ability to manage respiratory symptoms at end of life  Outcome: Progressing Towards Goal     Problem: Pressure Injury - Risk of  Goal: *Prevention of pressure injury  Description: Document Tye Scale and appropriate interventions in the flowsheet. Outcome: Progressing Towards Goal  Note: Pressure Injury Interventions:  Sensory Interventions: Assess changes in LOC         Activity Interventions: Increase time out of bed    Mobility Interventions: HOB 30 degrees or less    Nutrition Interventions: Document food/fluid/supplement intake    Friction and Shear Interventions: HOB 30 degrees or less                Problem: Patient Education: Go to Patient Education Activity  Goal: Patient/Family Education  Outcome: Progressing Towards Goal     Problem: Falls - Risk of  Goal: *Absence of Falls  Description: Document Kade Fall Risk and appropriate interventions in the flowsheet. Outcome: Progressing Towards Goal  Note: Fall Risk Interventions:  Mobility Interventions: Bed/chair exit alarm    Mentation Interventions: Bed/chair exit alarm    Medication Interventions: Bed/chair exit alarm    Elimination Interventions: Bed/chair exit alarm    History of Falls Interventions: Bed/chair exit alarm         Problem: Patient Education: Go to Patient Education Activity  Goal: Patient/Family Education  Outcome: Progressing Towards Goal     Problem: Spiritual Evaluation  Goal: Identify beliefs/practices that support hospice experience  Description: Patient/family identify their beliefs/practices that impair Hospice experience. Patient/family identify their beliefs/practices that support Hospice experience. Patient coping identified. Spiritual distress identified and decreased with visit.   Outcome: Progressing Towards Goal

## 2021-01-14 NOTE — HOSPICE
[facility]  [phone]    Respite Nursing Note   Patient Name: Laurie Mckeon  YOB: 1970  Age: 53 y.o. Date of Visit: 01/14/21  Facility of Care: [facility]  Patient Room: [address] [place] [address] Attending: Kimberly Jules MD  Hospice Diagnosis: Cervical cancer [facility]) [C53.9]    Level of Care: Respite    ASSESSMENT & PLAN     1. Patient admitted to Respite level of care due to:    [] Caregiver Exhaustion:    [x] Caregiver Breakdown: patient does not have 24/7 caregivers, confusion and safety issues s/p fall- not safe to be left alone  2. Administer home medications as ordered including scheduled methadone and steroid taper     3. Patient continues to have severe SOB and wheezing- currently on 6L o2, PRN nebulizer ordered for wheezing, PRN roxanol ordered for dyspnea , dexamethasone ordered  4. patient remains confused. Sleeping and lethargic but arouses easily- alert and oriented 1-2, redirect and reorient frequently. Administer ordered ativan as needed  5. Provide support to patient at bedside, patient is confused and anxious; reorient frequently, bed alarm in place  6. Patient states she is hungry however has very poor po intake even when offered and encouraged    Nursing Interventions: monitor patient closely, encourage PRN medications, titrate medications as needed for effective symptom relief    Spiritual Interventions:  visits as indicated    Psych/ Social/ Emotional Interventions: reorient to hospital frequently. Provide updates to family/friends as needed-  1/14daughter will be visiting today     Care Coordination Needs: will more than likely need 24/7 care at [place], [facility] approved.  [facility] submitted today 1/24 by cm at [facility] and New Orders Discussed / Approved with Dr. Kika Nicole MD.    Description History and Chart Review     List number of doses of PRN medications in last 24 hours:  Medication 1: Ativan  Number of doses: 1    Medication 2: roxanol  Number of doses: 1    Medication 3:  Compazine   Number of doses: 1    DISCHARGE PLANNING     1. Discharge Plan: home once respite stay has ended or to LTC if needed   2. Patient/Family teaching: respite stay   3.  Response to patient/family teaching:  Family receptive, patient unable to verbalize understanding     ASSESSMENT    KARNOFSKY: 30      Prognosis estimated based on 01/14/21 clinical assessment is:   [] Few to Many Hours  [] Hours to Days   [] Few to Many Days   [] Days to Weeks   [] Few to Many Weeks   [x] Weeks to Months   [] Few to Many Months    Quality Measure: Patient self-reports:  []  Yes   [x]  No    ESAS:   Time of Assessment: 1030  Pain (1-10):0  Fatigue (1-10): 8  Shortness of breath (1-10):8  Nausea (1-10): 0  Appetite (1-10): 3   Anxiety: (1-10):   Depression: (1-10):   Well-being: (1-10):   Constipation: _ Yes  x_ No    CLINICAL INFORMATION     Patient Vitals for the past 12 hrs:   Temp Pulse Resp BP SpO2   01/13/21 2337 97.7 °F (36.5 °C) (!) 105 25 126/88 100 %       Currently this patient has:  [x] Supplemental O2   [] IV    [] PICC      [] PORT   [] NG Tube    [] PEG Tube   [] Ostomy     [] Rivas draining _______ urine  [] Other    SIGNS/PHYSICAL FINDINGS     Skin:  [x] Warm, dry, supple, intact and color normal for race  [] Warm   [] Dry   [] Cool     [] Clammy       [] Diaphoretic    Turgor   [] Normal   [x] Decreased  Color:   [] Pink   [x] Pale   [] Cyanotic   [] Erythema   [] Jaundice   [] Normal for Race  []  Wounds:    Neuro:  [] Lethargy  [x] Restlessness / agitation  [x] Confusion / delirium  [] Hallucinations  [] Responds to maximal stimulation  [] Unresponsive  [] Seizures     Cardiac:  [] Dyspnea on Exertion  [] JVD  [] Murmur  [] Palpitations  [] Hypotension  [] Hypertension  [x] Tachycardia  [] Bradycardia  [] Irregular HR  [] Pulses Decreased  [] Pulses Absent  [x] Edema:   3+ edema to bilateral extremities     (Location, Grade and Pitting)  [] Mottling: (Location)    Respiratory:  Breath sounds:    [] Diminished   [x] Wheeze   [] Rhonchi   [] Rales   [] Even and unlabored  [x] Labored:   Very dyspneic both at rest and on exertion         [x] Cough   [x] Non Productive   [] Productive    [] Description:           [] Deep suctioned   [x] O2 at _6__ LPM  [] High flow oxygen greater than 10 LPM  [] Bi-Pap    GI:  [] Abdomen (describe)   [] Ascites  [] Nausea  [] Vomiting  [] Incontinent of bowels  [x] Bowel sounds (yes/no)  [] Diarrhea  [] Constipation (see above including last bowel movement)  [] Checked for impaction  [] Last BM       Nutrition  Diet:__regular________  Appetite:   [] Good   [] Fair   [x] Poor   [] Tube Feeding     :  [] Voiding  [] Incontinent   [x] Rivas - dark yellow urine    Musculoskeletal  [x] Balance/[place] Unsteady   [x] Weak   Strength:    [] Normal    [x] Limited    [] Decreasing   Activities:    [] Up as tolerated   [] Bedridden    [x] Specify: w/ stand by assistance    SAFETY  [] 24 hr. Caregiver   [x] Side rails ? [x] Hospital bed   [x] Reviewed Falls & Safety     ALLERGIES AND MEDICATIONS     Allergies:    Allergies   Allergen Reactions    Shrimp Anaphylaxis    Duloxetine Drowsiness and Palpitations     racing heartbeat, decreased level of alertness    Hydromorphone Other (comments)     vomiting     Morphine Other (comments)     vomiting  1/8/21:  Morphine on MAR and in pt home - plan is to administer anti-nausea medication prior to morphine if pt requires use of morphine per Dr. Shadi Samuels          Current Facility-Administered Medications   Medication Dose Route Frequency    haloperidol (HALDOL) 2 mg/mL oral solution 2 mg  2 mg SubLINGual Q6H PRN    ondansetron (ZOFRAN ODT) tablet 4 mg  4 mg Oral Q6H PRN    LORazepam (ATIVAN) tablet 0.5 mg  0.5 mg Oral Q6H PRN    acetaminophen (TYLENOL) tablet 650 mg  650 mg Oral Q4H PRN    Or    acetaminophen (TYLENOL) solution 650 mg  650 mg Oral Q4H PRN    Or    acetaminophen (TYLENOL) suppository 650 mg  650 mg Rectal Q4H PRN    hyoscyamine SL (LEVSIN/SL) tablet 0.125 mg  0.125 mg SubLINGual Q4H PRN    bisacodyL (DULCOLAX) suppository 10 mg  10 mg Rectal DAILY PRN    albuterol (PROVENTIL VENTOLIN) nebulizer solution 2.5 mg  2.5 mg Nebulization Q4H PRN    morphine (ROXANOL) concentrated oral syringe 5 mg  5 mg Oral Q3H PRN    senna-docusate (PERICOLACE) 8.6-50 mg per tablet 1 Tab  1 Tab Oral DAILY    polyethylene glycol (MIRALAX) packet 17 g  17 g Oral DAILY    prochlorperazine (COMPAZINE) tablet 10 mg  10 mg Oral Q6H PRN    methadone (DOLOPHINE) tablet 10 mg  10 mg Oral BID    dexAMETHasone (DECADRON) tablet 4 mg  4 mg Oral DAILY    amLODIPine (NORVASC) tablet 10 mg  10 mg Oral DAILY    losartan (COZAAR) tablet 50 mg  50 mg Oral DAILY    melatonin tablet 3 mg  3 mg Oral QHS PRN    hydroCHLOROthiazide (HYDRODIURIL) tablet 25 mg  25 mg Oral DAILY    [START ON 1/16/2021] dexAMETHasone (DECADRON) tablet 2 mg  2 mg Oral DAILY

## 2021-01-14 NOTE — HOSPICE
Ms. Ella Nguyen is a home hospice patient who's currently receiving respite care at [facility]. Ms. Ella Nguyen appeared fatigued and seemed to struggle to breath. She was on supplemental oxygen.  was alert and oriented and able to converse in a limit manner due to the difficulty breathing. Ms. Ella Nguyen prefers to be called [place]. She identifies a [facility] [employer]. Ms. Ella Nguyen was receptive to prayers for herself and family. The  offered a prayer of reassurance in the midst of uncertainty for herself and family. Afterwards, Ms. Ella Nguyen expressed appreciation for the prayer and visit.  She also verbalized understanding that chaplains' services were available upon request.

## 2021-01-14 NOTE — HOSPICE
[facility]  [phone]     Patient Name: Hernan Berry  YOB: 1970  Age: 53 y.o. [facility] RN Note:  Patient evaluated at bedside w/ hospice MD. Due to severity of ongoing dyspnea patient will require IV steroids and opioids. Will plan to access port-a-cath and order IV medications. Will increase LOC to GIP for IV symptom management  Change  to [facility] level of care; SN visit daily X 7 with 5 visits PRN symptom control; SW visit 1 X weekly and 5 visits PRN family support,  visit 1 X weekly and 5 visits PRN spiritual support. Thank you for the opportunity to be of service to this patient.

## 2021-01-14 NOTE — PROGRESS NOTES
Bedside and Verbal shift change report given to Jaime Man RN (oncoming nurse) by Mustapha Avina RN (offgoing nurse). Report included the following information SBAR, Kardex, Intake/Output, MAR and Accordion.

## 2021-01-14 NOTE — PROGRESS NOTES
Bedside and Verbal shift change report given to Jed Velasquez rn  (oncoming nurse) by Brady Mcrae  (offgoing nurse). Report included the following information SBAR and Kardex.

## 2021-01-14 NOTE — H&P
[facility]  [phone]    Patient Name: Dmitry Montgomery  YOB: 1970    Date of Provider Hospice Visit: 01/14/21    Level of Care:   [x] General Inpatient (GIP)    [] Routine   [] Respite    Current Location of Care:  [] [facility] [x] [facility] [] [address] [] [facility] [] [facility]    IF [facility], patient referred from:  [] [facility] [] [facility] [] [address] [] [facility] [] Home [] Other:       Hospice terminal diagnosis:     Squamous cell carcinoma of cervix ([facility] [address] (C53.9)  Other Hospice diagnoses:     Metastatic malignant neoplasm, unspecified site ([facility] [address] (C79.9)     Hypertension, unspecified type (I10)     Chronic pain due to malignant neoplastic disease (G89.3)     Anemia, unspecified type (D64.9)     Encounter for hospice care (Z51.5)     HOSPICE SUMMARY   Do not cut and paste chart information other than imaging findings    mDitry Montgomery is a 53y.o. year old who was admitted to [facility]. 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 Patient with a PPS score of 50. Patient initially diagnosed with cancer in 2019, had 1 course of chemotherapy but had no further plans for cancer directed therapy. Unfortunately, she now shows progression of her disease as well as ongoing symptoms. Patient would like to focus on comfort with the support of hospice.     The patient is not likely to endanger self or others.        HOSPICE DIAGNOSES   Active Symptoms:  1. Labored breathing  2. Wheezing  3. Nausea  4. Fatigue/weakness/lethargy  5. Poor appetite  6. Decline in function  7. Anxiety/restlessness  8. Hospice care pt     PLAN   1. Pt initially was brought into [facility] yesterday for respite care due to caregiver exhaustion. Pt apparently has declined since then and is symptomatic requiring close monitoring and aggressive intervention including IV medications. Will change LOC to GIP  2.  Access port a cath and give IV medications as pt is lethargic and having difficulty swallowing  3. Initiate IV steroids: Solumedrol 120mg IV today and 60mg IV starting tomorrow x 3 doses  4. Continue O2 support at 6l NC  5. Albuterol nebs treatment every 6 hours  6. Lorazepam 0.5mg IV scheduled every 4 hours and every 15mts as needed  7. Morphine 2mg IV scheduled every 4 hours and every 15mts as needed  8. Other comfort meds as needed    9.  and SW to support family needs  8. Disposition: home with hospice once symptoms resolved or stabilized; unlikely as pt doing poorly currently  11. Hospice Plan of care was reviewed in detail and agree with current plan of care    Prognosis estimated based on 01/14/21 clinical assessment is:   [x] Hours to Days    [] Days to Weeks    [] Other:    Communicated plan of care with: Hospice Case Manager; Hospice IDT; Care Team     GOALS OF CARE     Patient/Medical POA stated Goal of Care: comfort    [x] I have reviewed and/or updated ACP information in the Advance Care Planning Navigator. This information is available in the [address] [place] [address] Drive link in the patient's chart header. Primary Decision Maker ([address]):     Resuscitation Status: DNR  If DNR is there a Durable DNR on file? : [x] Yes [] No (If no, complete Durable DNR)    HISTORY     History obtained from: pt, family; daughter, chart review, hospice staff    CHIEF COMPLAINT: Pt nodded head saying she was feeling short of breath  The patient is:   [x] Verbal  [] Nonverbal  [] Unresponsive    HPI/SUBJECTIVE:  Pt seen lethargic, short of breath and wheezing.           REVIEW OF SYSTEMS     The following systems were: [x] reviewed  [] unable to be reviewed    Positive ROS include:  Constitutional: fatigue, weakness, in pain, short of breath  Ears/nose/mouth/throat: increased airway secretions  Respiratory:shortness of breath, wheezing  Gastrointestinal:poor appetite, nausea, vomiting, abdominal pain, constipation, diarrhea  Musculoskeletal:pain, deformities, swelling legs  Neurologic:confusion, hallucinations, weakness  Psychiatric:anxiety, feeling depressed, poor sleep  Endocrine:     Adult Non-Verbal Pain Assessment Score: Face  [] 0   No particular expression or smile  [] 1   Occasional grimace, tearing, frowning, wrinkled forehead  [] 2   Frequent grimace, tearing, frowning, wrinkled forehead    Activity (movement)  [] 0   Lying quietly, normal position  [] 1   Seeking attention through movement or slow, cautious movement  [] 2   Restless, excessive activity and/or withdrawal reflexes    Guarding  [] 0   Lying quietly, no positioning of hands over areas of body  [] 1   Splinting areas of the body, tense  [] 2   Rigid, stiff    Physiology (vital signs)  [] 0   Stable vital signs  [] 1   Change in any of the following: SBP > 20mm Hg; HR > 20/minute  [] 2   Change in any of the following: SBP > 30mm Hg; HR > 25/minute    Respiratory  [] 0   Baseline RR/SpO2, compliant with ventilator  [] 1   RR > 10 above baseline, or 5% drop SpO2, mild asynchrony with ventilator  [] 2   RR > 20 above baseline, or 10% drop SpO2, asynchrony with ventilator     FUNCTIONAL ASSESSMENT     Palliative Performance Scale (PPS):30%       PSYCHOSOCIAL/SPIRITUAL ASSESSMENT     Active Problems:    Cervical cancer (HCC) (1/13/2021)      No past medical history on file. No past surgical history on file. Social History     Tobacco Use    Smoking status: Not on file   Substance Use Topics    Alcohol use: Not on file     No family history on file.    Allergies   Allergen Reactions    Shrimp Anaphylaxis    Duloxetine Drowsiness and Palpitations     racing heartbeat, decreased level of alertness    Hydromorphone Other (comments)     vomiting     Morphine Other (comments)     vomiting  1/8/21:  Morphine on MAR and in pt home - plan is to administer anti-nausea medication prior to morphine if pt requires use of morphine per Dr. Gloria Talley Facility-Administered Medications   Medication Dose Route Frequency    albuterol sulfate (PROVENTIL;VENTOLIN) 2.5 mg/0.5 mL nebulizing solution        methylPREDNISolone (PF) (SOLU-MEDROL) injection 120 mg  120 mg IntraVENous ONCE    [START ON 1/15/2021] methylPREDNISolone (PF) (SOLU-MEDROL) injection 60 mg  60 mg IntraVENous DAILY    morphine injection 2 mg  2 mg IntraVENous Q15MIN PRN    morphine (ROXANOL) concentrated oral syringe 5 mg  5 mg Oral Q6H    ondansetron (ZOFRAN ODT) tablet 4 mg  4 mg Oral Q6H    albuterol (PROVENTIL VENTOLIN) nebulizer solution 2.5 mg  2.5 mg Nebulization Q6H RT    haloperidol (HALDOL) 2 mg/mL oral solution 2 mg  2 mg SubLINGual Q6H PRN    LORazepam (ATIVAN) tablet 0.5 mg  0.5 mg Oral Q6H PRN    acetaminophen (TYLENOL) tablet 650 mg  650 mg Oral Q4H PRN    Or    acetaminophen (TYLENOL) solution 650 mg  650 mg Oral Q4H PRN    Or    acetaminophen (TYLENOL) suppository 650 mg  650 mg Rectal Q4H PRN    hyoscyamine SL (LEVSIN/SL) tablet 0.125 mg  0.125 mg SubLINGual Q4H PRN    bisacodyL (DULCOLAX) suppository 10 mg  10 mg Rectal DAILY PRN    senna-docusate (PERICOLACE) 8.6-50 mg per tablet 1 Tab  1 Tab Oral DAILY    polyethylene glycol (MIRALAX) packet 17 g  17 g Oral DAILY    prochlorperazine (COMPAZINE) tablet 10 mg  10 mg Oral Q6H PRN    methadone (DOLOPHINE) tablet 10 mg  10 mg Oral BID    amLODIPine (NORVASC) tablet 10 mg  10 mg Oral DAILY    losartan (COZAAR) tablet 50 mg  50 mg Oral DAILY    hydroCHLOROthiazide (HYDRODIURIL) tablet 25 mg  25 mg Oral DAILY        PHYSICAL EXAM     Wt Readings from Last 3 Encounters:   12/16/20 44.5 kg (98 lb)       Visit Vitals  /88 (BP 1 Location: Left arm, BP Patient Position: Sitting)   Pulse (!) 105   Temp 97.7 °F (36.5 °C)   Resp 25   SpO2 100%       Supplemental O2  [x] Yes: 6L NC  [] NO  Last bowel movement: today 01/14/21    Currently this patient has:  [] Peripheral IV [] PICC  [x] PORT [] ICD    [x] Rivas Catheter [] NG Tube   [] PEG Tube    [] Rectal Tube [] Drain  [] Other:     Constitutional: alert but looks chronically ill & tired, weak, frail, thin, emaciated, appears to be wheezing and in labored breathing  Eyes: pallor  ENMT: dry mucous membranes  Cardiovascular: slightly tachycardic, bilateral leg edema  Respiratory: diffuse wheezing, chest full of coarse dry rales, labored breathing  Gastrointestinal: soft abdomen, non tender, bowel sounds +  Musculoskeletal: thin extremities, muscle wasting  Skin: no lesions, warm  Neurologic: alert, somewhat restless, fidgety  Psychiatric:  anxious  Other:       Pertinent Lab and or Imaging Tests:  No results found for: NA, K, CL, CO2, AGAP, GLU, BUN, CREA, BUCR, GFRAA, GFRNA, CA, GFRAA  No results found for: TP, ALBR, TALB, ALB        Total time: 70mts  Counseling / coordination time: 35mts  > 50% counseling / coordination?:

## 2021-01-14 NOTE — PROGRESS NOTES
11:06 AM  Per [facility]- requesting [facility] for personal care services. CM completed [facility] and submitted for review.  Sarika Francis

## 2021-01-15 NOTE — PROGRESS NOTES
Bedside and Verbal shift change report given to Ollie Hernandez rn  (oncoming nurse) by Tierra Perez  (offgoing nurse). Report included the following information SBAR and Kardex.

## 2021-01-15 NOTE — HOSPICE
LCSW met with patient at bedside for routine visit during respite to [facility] inpatient stay. Patient with labored breathing during visit and minimally responsive to voice. Her eyes are open but glazed. She did move her legs at second calling of her name to introduce self. Friend asleep at bedside as well on the cot, SW did not arouse her. LCSW will remain available for ongoing support and discharge planning if needed.     SACHIN Ruff, [facility]   [phone]

## 2021-01-15 NOTE — PROGRESS NOTES
1346  Pt with increasing agonal respirations and use of accessory muscles. Pt needed one time PRN morphine prior to scheduled regimen this morning and a dose of ativan at 1329 due to more restlessness. Will update hospice team and continue to monitor. 1640  Pt's daughter requesting documentation regarding hospice admission. Paged hospice and gave them daughter's information. [address] to room by family. Pt with no visible respirations or audible heart sounds.  paged. Dr. Molina Hint at bedside to pronounce. Nursing supervisor made aware. Hospice team called. [facility] called. [facility] will be calling unit. Family wishes for cremation at [facility]. Pt's daughter still requesting \"hospice\" documentation, hospice RN on call will be contacting her. 2000  Pt's family still wishes to be at bedside. 2045  Family has left. Rivas removed. Port de-accessed. Post-mortem care completed.

## 2021-01-15 NOTE — PROGRESS NOTES
[address] [place] [address] [facility]  [phone]    Patient Name: Yeimy Acevedo  YOB: 1970    Date of Provider Hospice Visit: 01/15/21    Level of Care:   [x] General Inpatient (GIP)    [] Routine   [] Respite    Current Location of Care:  [] [facility] [x] [facility] [] [facility] [] [facility] [] [facility]    IF [facility], patient referred from:  [] [facility] [] [facility] [] [facility] [] [facility] [] Home [] Other:       Hospice terminal diagnosis:     Squamous cell carcinoma of cervix ([facility] [address] (C53.9)  Other Hospice diagnoses:     Metastatic malignant neoplasm, unspecified site ([facility] [address] (C79.9)     Hypertension, unspecified type (I10)     Chronic pain due to malignant neoplastic disease (G89.3)     Anemia, unspecified type (D64.9)     Encounter for hospice care (Z51.5)     HOSPICE SUMMARY   Do not cut and paste chart information other than imaging findings    Yeimy Acevedo is a 53y.o. year old who was admitted to [address] [place] [address]. Patient is a 78-year-old female admitted to hospice care secondary to metastatic squamous cell cancer of the cervix. Patient with a PPS score of 50. Patient initially diagnosed with cancer in 2019, had 1 course of chemotherapy but had no further plans for cancer directed therapy. Unfortunately, she now shows progression of her disease as well as ongoing symptoms. Patient would like to focus on comfort with the support of hospice.     The patient is not likely to endanger self or others.        HOSPICE DIAGNOSES   Active Symptoms:  1. Labored breathing  2. Wheezing  3. Nausea  4. Fatigue/weakness/lethargy  5. Poor appetite  6. Decline in function  7. Anxiety/restlessness  8. Hospice care pt     PLAN   1. Continue GIP LOC as pt continues with requirement of IV medications and close monitoring. 2. Continue IV steroids: Solumedrol 60mg IVx 2 doses left  3. Continue O2 support at 6l NC  4. D/C nebulizer treatments as not effective anymore and difficult to give since pt is so lethargic  5.  Continue Lorazepam 0.5mg IV scheduled every 4 hours and every 15mts as needed  6. Increase Morphine 3mg IV scheduled every 4 hours and 4mg IV every 15mts as needed  7. Other comfort meds as needed    8.  and SW to support family needs  9. Disposition: home with hospice if symptoms stabilized; unlikely as pt doing poorly and close to end   10. Hospice Plan of care was reviewed in detail and agree with current plan of care    Prognosis estimated based on 01/15/21 clinical assessment is:   [x] Hours to Days    [] Days to Weeks    [] Other:    Communicated plan of care with: Hospice Case Manager; Hospice IDT; Care Team     GOALS OF CARE     Patient/Medical POA stated Goal of Care: comfort    [x] I have reviewed and/or updated ACP information in the Advance Care Planning Navigator. This information is available in the [facility] [address] link in the patient's chart header. Primary Decision Maker (Health Care Agent):     Resuscitation Status: DNR  If DNR is there a Durable DNR on file? : [x] Yes [] No (If no, complete Durable DNR)    HISTORY     History obtained from: chart review, hospice staff    CHIEF COMPLAINT: N/A  The patient is:   [] Verbal  [x] Nonverbal  [] Unresponsive    HPI/SUBJECTIVE:  Pt seen lethargic, opens eyes briefly but unable to maintain open and does not track or answer. Pt got all scheduled IV meds and 1 prn morphine.          REVIEW OF SYSTEMS     The following systems were: [] reviewed  [x] unable to be reviewed      Adult Non-Verbal Pain Assessment Score:  5    Face  [] 0   No particular expression or smile  [x] 1   Occasional grimace, tearing, frowning, wrinkled forehead  [] 2   Frequent grimace, tearing, frowning, wrinkled forehead    Activity (movement)  [x] 0   Lying quietly, normal position  [] 1   Seeking attention through movement or slow, cautious movement  [] 2   Restless, excessive activity and/or withdrawal reflexes    Guarding  [x] 0   Lying quietly, no positioning of hands over areas of body  [] 1   Splinting areas of the body, tense  [] 2   Rigid, stiff    Physiology (vital signs)  [] 0   Stable vital signs  [] 1   Change in any of the following: SBP > 20mm Hg; HR > 20/minute  [x] 2   Change in any of the following: SBP > 30mm Hg; HR > 25/minute    Respiratory  [] 0   Baseline RR/SpO2, compliant with ventilator  [] 1   RR > 10 above baseline, or 5% drop SpO2, mild asynchrony with ventilator  [x] 2   RR > 20 above baseline, or 10% drop SpO2, asynchrony with ventilator     FUNCTIONAL ASSESSMENT     Palliative Performance Scale (PPS):20%       PSYCHOSOCIAL/SPIRITUAL ASSESSMENT     Active Problems:    Cervical cancer ([facility] [address] (1/13/2021)      No past medical history on file. No past surgical history on file. Social History     Tobacco Use    Smoking status: Not on file   Substance Use Topics    Alcohol use: Not on file     No family history on file.    Allergies   Allergen Reactions    Shrimp Anaphylaxis    Duloxetine Drowsiness and Palpitations     racing heartbeat, decreased level of alertness    Hydromorphone Other (comments)     vomiting     Morphine Other (comments)     vomiting  1/8/21:  Morphine on MAR and in pt home - plan is to administer anti-nausea medication prior to morphine if pt requires use of morphine per Dr. Jorden Spatz         Current Facility-Administered Medications   Medication Dose Route Frequency    methylPREDNISolone (PF) (SOLU-MEDROL) injection 60 mg  60 mg IntraVENous DAILY    morphine injection 2 mg  2 mg IntraVENous Q15MIN PRN    sodium chloride (NS) flush 5-10 mL  5-10 mL IntraVENous PRN    acetaminophen (TYLENOL) suppository 650 mg  650 mg Rectal Q4H PRN    morphine injection 2 mg  2 mg IntraVENous Q4H    LORazepam (ATIVAN) injection 0.5 mg  0.5 mg IntraVENous Q15MIN PRN    glycopyrrolate (ROBINUL) injection 0.2 mg  0.2 mg IntraVENous Q4H PRN    LORazepam (ATIVAN) injection 0.5 mg  0.5 mg IntraVENous Q4H    ondansetron (ZOFRAN) injection 4 mg  4 mg IntraVENous Q4H PRN    bisacodyL (DULCOLAX) suppository 10 mg  10 mg Rectal DAILY PRN        PHYSICAL EXAM     Wt Readings from Last 3 Encounters:   12/16/20 44.5 kg (98 lb)       Visit Vitals  /85 (BP 1 Location: Left arm, BP Patient Position: At rest)   Pulse (!) 108   Temp (!) 96.5 °F (35.8 °C)   Resp 25   SpO2 (!) 89%       Supplemental O2  [x] Yes: 6L NC  [] NO  Last bowel movement: today 01/14/21    Currently this patient has:  [] Peripheral IV [] PICC  [x] PORT [] ICD    [x] Rivas Catheter [] NG Tube   [] PEG Tube    [] Rectal Tube [] Drain  [] Other:     Constitutional:  chronically ill & tired, weak, frail, thin, emaciated, appears to be lethargic  Eyes: pallor  ENMT: dry mucous membranes  Cardiovascular: slightly tachycardic, bilateral leg edema  Respiratory: chest full of coarse dry rales, slightly labored breathing  Gastrointestinal: soft abdomen, non tender, bowel sounds +  Musculoskeletal: thin extremities, muscle wasting  Skin: no lesions, warm  Neurologic: lethargic  Psychiatric:   Other:       Pertinent Lab and or Imaging Tests:  No results found for: NA, K, CL, CO2, AGAP, GLU, BUN, CREA, BUCR, GFRAA, GFRNA, CA, GFRAA  No results found for: TP, ALBR, TALB, ALB        Total time: 35 mts  Counseling / coordination time:  > 50% counseling / coordination?:

## 2021-01-15 NOTE — PROGRESS NOTES
responded to germaine by staff to provided support to pt's family and friends. Pt Miss Luna, goes by Bernie Burrell, was in bed at the time of visit. She was not alert and oriented and appeared to have difficulty breathing. Lisbet Ortega, who introduced herself to  as pt's  friend for many years, was present. Another family friend walked in during the visit. Lisbet Ortega other family friend engaged  at length in life review, sharing pt's stories; her resilience and friendly nature. Bernie Burrell enjoys life outdoors, loves the ocean and seafood. She has three children and a grand child. Her parent live in [place], and they have called regularly for updates. Amalia is important to pt for coping. Morene Pool shared hat pt has struggled with her health for the past year or so. But she has always  stayed positive through these difficult times.  remained mainly a listening presence, affirming thoughts and feelings. When  inquired how he may support pt's friends, they expressed no need for support. Lisbet Ortega stated that just talking with  was very helpful. She thanked  for the visit. They were advised of chaplains availability. Visited by: Giulia rosenbergin: [phone])

## 2021-01-15 NOTE — ROUTINE PROCESS
Bedside and Verbal shift change report given to Severino Harmon RN (oncoming nurse) by Ramon Hunt RN (offgoing nurse). Report included the following information SBAR, Kardex, Intake/Output, MAR, Accordion and Recent Results.

## 2021-01-15 NOTE — HOSPICE
[facility] Help to Those in Need  [phone]    GIP Daily Nursing Note   Patient Name: Kyleigh Bryan  YOB: 1970  Age: 53 y.o. Date of Visit: 01/15/21  Facility of Care: [facility]  Patient Room: [address] Attending: Carmella Alvarado MD  Hospice Diagnosis: Cervical cancer [facility]) [C53.9]    Level of Care: GIP    Current GIP Symptoms    1. Pain  2. Dyspnea  3. Anxiety  4. secretions        ASSESSMENT & PLAN   Must update Plan of Care including visit frequencies for IDT members  1. Increased morphine to 4mg every 3 hours with prn available every 15 minutes for severe pain,dyspnea  2. IV ativan 0.5mg every 4 hours with prn available every 15 minutes for severe anxiety, agitation  3. IV robinul every 4 hours as needed for secretions, turn and reposition patient every 4 hours  4. Begin to wean O2, take down by 1 liter every 1 hour as tolerated  5. Support family as they maintain bowling at bedside, son came today, expecting oldest daughter this evening. Spiritual Interventions:  visited with family today    Psych/ Social/ Emotional Interventions: patient has large family with support being offered at bedside    Care Coordination Needs: reviewed with Dr. Xiomara Lopez, staff, and family at bedside    Care plan and New Orders discussed / approved with Mary Pierre MD.    Description History and Chart Review   If this is initial GIP note must document RN assessment/MD communication in previous setting. Specifically document nursing/medication needs in last 24 hours to support GIP care  Narrative History of last 24 hours that demonstrates care cannot be provided in another setting:  Patient requiring IV medications, titration of medications, RN monitoring, appears imminent    What has been done to control the patient's symptoms in the last 24 hours? Increased dosing of scheduled medications, prn use of medications    Does the patient currently require IV medications?  yes  Does the patient currently require scheduled medications? yes  Does the patient currently require a PCA? no    List number of doses of PRN medications in last 24 hours:  Medication 1:morphine  Number of doses:1    Medication 2: ativan  Number of doses:1    Medication 3:   Number of doses:    Supporting documentation for GIP need for pain control:  [x] Frequent evaluation by a doctor, nurse practitioner, nurse   [] Frequent medication adjustment    [x] IVs that cannot be administered at home   [] Aggressive pain management   [] Complicated technical delivery of medications              Supporting documentation for GIP need for symptom control:  [x]  Sudden decline necessitating intensive nursing intervention  []  Uncontrolled / intractable nausea or vomiting   []  Pathological fractures  []  Advanced open wounds requiring frequent skilled care  [] Unmanageable respiratory distress  [] New or worsening delirium   [] Delirium with behavior issues: Is 24 hour caregiver present due to safety concerns with agitation? (yes/no)  [x] Imminent death - with skilled nursing needs documented above    DISCHARGE PLANNING   Daily discharge planning required for GIP  1. Discharge Plan: patient will likely pass at [facility]  2. Patient/Family teaching: end of life signs and symptoms  3. Response to patient/family teaching: family tearful at bedside    ASSESSMENT    KARNOFSKY: 10    Prognosis estimated based on 01/15/21 clinical assessment is:   [x] Few to Many Hours  [] Hours to Days   [] Few to Many Days   [] Days to Weeks   [] Few to Many Weeks   [] Weeks to Months   [] Few to Many Months    Quality Measure: Patient self-reports:  [] Yes    [x] No    ESAS:   Time of Assessment: 1500  Pain (1-10):9  Fatigue (1-10): 9  Shortness of breath (1-10):10  Nausea (1-10): 5Appetite (1-10):    Anxiety: (1-10):   Depression: (1-10):   Well-being: (1-10):   Constipation: _ Yes  x_ No  LAST BM: 1/13/2021    CLINICAL INFORMATION     Patient Vitals for the past 12 hrs:   Temp Pulse Resp BP SpO2   01/15/21 0521 (!) 96.5 °F (35.8 °C) (!) 108 25 114/85 (!) 89 %       Currently this patient has:  [x] Supplemental O2   [x] IV    [] PICC      [] PORT   [] NG Tube    [] PEG Tube   [] Ostomy     [x] Rivas draining _______ urine  [] Other:     SIGNS/PHYSICAL FINDINGS     Skin (including wound):  [] Warm, dry, supple, intact and color normal for race  [x] Warm   [x] Dry   [] Cool     [] Clammy       [] Diaphoretic    Turgor   [] Normal   [x] Decreased  Color:   [] Pink   [] Pale   [] Cyanotic   [] Erythema   [x] Jaundice   [] Normal for Race  []  Wounds:    Neuro:  [] Lethargy  [] Restlessness / agitation  [] Confusion / delirium  [] Hallucinations  [] Responds to maximal stimulation  [x] Unresponsive  [] Seizures     Cardiac:  [] Dyspnea on Exertion  [] JVD  [] Murmur  [] Palpitations  [x] Hypotension  [] Hypertension  [x] Tachycardia  [] Bradycardia  [x] Irregular HR  [] Pulses Decreased  [] Pulses Absent  [] Edema:       (Location, Grade and Pitting)  [] Mottling:      (Location)    Respiratory:  Breath sounds:    [x] Diminished   [] Wheeze   [] Rhonchi   [] Rales   [] Even and unlabored  [x] Labored: 22           [] Cough   [] Non Productive   [] Productive    [] Description:           [] Deep suctioned   [] O2 at _5__ LPM  [] High flow oxygen greater than 10 LPM  [] Bi-Pap    GI  [x] Abdomen (describe)   [] Ascites  [] Nausea  [] Vomiting  [x] Incontinent of bowels  [] Bowel sounds (yes/no)  [] Diarrhea  [] Constipation (see above including last bowel movement)  [] Checked for impaction  [x] Last BM 01/13/2021    Nutrition  Diet:_NPO_________  Appetite:   [] Good   [] Fair   [] Poor   [] Tube Feeding       [] Voiding  [] Incontinent   [x] Rivas    Musculoskeletal  [] Balance/[place] Unsteady   [x] Weak   Strength:    [] Normal    [] Limited    [x] Decreasing   Activities:    [] Up as tolerated   [x] Bedridden    [] Specify:    SAFETY  [] 24 hr. Caregiver   [x] Side rails ?     [x] Hospital bed   [] Reviewed Falls & Safety       ALLERGIES AND MEDICATIONS     Allergies:    Allergies   Allergen Reactions    Shrimp Anaphylaxis    Duloxetine Drowsiness and Palpitations     racing heartbeat, decreased level of alertness    Hydromorphone Other (comments)     vomiting     Morphine Other (comments)     vomiting  1/8/21:  Morphine on MAR and in pt home - plan is to administer anti-nausea medication prior to morphine if pt requires use of morphine per Dr. Jorden Spatz          Current Facility-Administered Medications   Medication Dose Route Frequency    morphine injection 3 mg  3 mg IntraVENous Q4H    morphine injection 4 mg  4 mg IntraVENous Q15MIN PRN    methylPREDNISolone (PF) (SOLU-MEDROL) injection 60 mg  60 mg IntraVENous DAILY    sodium chloride (NS) flush 5-10 mL  5-10 mL IntraVENous PRN    acetaminophen (TYLENOL) suppository 650 mg  650 mg Rectal Q4H PRN    LORazepam (ATIVAN) injection 0.5 mg  0.5 mg IntraVENous Q15MIN PRN    glycopyrrolate (ROBINUL) injection 0.2 mg  0.2 mg IntraVENous Q4H PRN    LORazepam (ATIVAN) injection 0.5 mg  0.5 mg IntraVENous Q4H    ondansetron (ZOFRAN) injection 4 mg  4 mg IntraVENous Q4H PRN    bisacodyL (DULCOLAX) suppository 10 mg  10 mg Rectal DAILY PRN          Visit Time In: 1500  Visit Time Out: 4802

## 2021-01-16 NOTE — PROGRESS NOTES
responded to death of pt, Miss Luna on Med Surg unit. Large family/friends present. Family engaged  in life review and processed their emotions.  provided empathetic listening, affirmation of thoughts and emotions encouraged grief, and provided emotional and spiritual support to pt's daughter. When family requested for some time alone with pt,  provided space and advised family  to contact spiritual care if need be. Visited by: Sandy Benítez.   To germaine : [phone]

## 2021-01-16 NOTE — PROGRESS NOTES
[address] called to place hold at this time. Will call back after communication with patient's daughter. Will not release at this time.

## 2021-01-16 NOTE — PROGRESS NOTES
I was called to examine patient who  at ~ 18:50 hours. On examination, patient had:    No response to verbal and tactile stimuli. No respiratory effort. Absent heart sounds and pulses. Pupils fixed and dilated. Patient was pronounced dead at 19:17 hours. Patient's next of kin/ her family were present at the bedside at the time of pronouncement and nurse notes that patient's attending will be notified.      Jami Connell MD   Hospitalist

## 2021-01-16 NOTE — HOSPICE
[facility]  [phone]    Discharge/Death Nursing Note   Patient Name: Rox Hernandez  YOB: 1970  Age: 53 y.o. Date of Death: 1/15/21  Admitted Date: 2021  Time of Death: [address] of Care: [facility]  Level of Care: [facility]  Patient Room:      Hospice Attending: Vanessa Junior MD  Hospice Diagnosis: Cervical cancer [facility]) [C53.9]    Death Gabbie Justen of death completed by: Wanda Dumas MD    Agency staff was not present at the time of death    At the time of death the patient was documented as:    No response to verbal and tactile stimuli. No respiratory effort. Absent heart sounds and pulses. Pupils fixed and dilated. The pt  within Med/Surg Unite    The following were notified of the patient's death: family was at bedside    Medications were disposed of per facility protocol     Discharge Summary   Discharge Reason: Death    Summary of Care Provided:    [x] Post mortem care provided by Med Surg Unit staff  [x] Notification of  home by nursing supervisor  [] Referrals/Community resources provided:   [] Goals completed  [] Durable Medical Equipment vendor notified     Disciplines involved: [x] RN [] SW [x]  [] RAMIREZ [] Vol [] PT [] OT [] ST [] BC    [x] IDT communication/notification    Attending Physician, Dr. Jorge A Sepulveda, notified of death    Bereaved   See initial bereavement assessment     No flowsheet data found.

## 2021-01-18 ENCOUNTER — HOME CARE VISIT (OUTPATIENT)
Dept: HOSPICE | Facility: HOSPICE | Age: 51
End: 2021-01-18
Payer: COMMERCIAL

## 2021-01-18 NOTE — DISCHARGE SUMMARY
Discharge Summary    [facility]  [facility] Help to Those in Need  [phone]      Date of Admission: 1/13/2021  Date of Discharge: 1/15/2021    Laine Schaffer is a 53y.o. year old who was admitted to [facility] at [facility] with a Hospice diagnosis of Cervical cancer ([address] [C53.9]. Pt was admitted for [facility] level care. 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  Patient with a PPS score of 48.  Patient initially diagnosed with cancer in 2019, had 1 course of chemotherapy but had no further plans for cancer directed therapy.  Unfortunately, she now shows progression of her disease as well as ongoing symptoms.  Patient would like to focus on comfort with the support of hospice.     The patient is not likely to endanger self or others.         HOSPICE DIAGNOSES   Active Symptoms:  1. Labored breathing  2. Wheezing  3. Nausea  4. Fatigue/weakness/lethargy  5. Poor appetite  6. Decline in function  7. Anxiety/restlessness  8. Hospice care pt      PLAN   1. Pt initially was brought into [facility] yesterday for respite care due to caregiver exhaustion. Pt apparently has declined since then and is symptomatic requiring close monitoring and aggressive intervention including IV medications. Will change LOC to [facility]  2. Access port a cath and give IV medications as pt is lethargic and having difficulty swallowing  3. Initiate IV steroids: Solumedrol 120mg IV today and 60mg IV starting tomorrow x 3 doses  4. Continue O2 support at 6l NC  5. Albuterol nebs treatment every 6 hours  6. Lorazepam 0.5mg IV scheduled every 4 hours and every 15mts as needed  7. Morphine 2mg IV scheduled every 4 hours and every 15mts as needed  8. Other comfort meds as needed     9.  and SW to support family needs  8. Disposition: home with hospice once symptoms resolved or stabilized; unlikely as pt doing poorly currently  11.  Hospice Plan of care was reviewed in detail and agree with current plan of care            The patient's care was focused on comfort and the patient passed away on 1/15/2021.

## 2021-01-20 ENCOUNTER — HOME CARE VISIT (OUTPATIENT)
Dept: HOSPICE | Facility: HOSPICE | Age: 51
End: 2021-01-20
Payer: COMMERCIAL

## 2021-01-21 ENCOUNTER — HOME CARE VISIT (OUTPATIENT)
Dept: HOSPICE | Facility: HOSPICE | Age: 51
End: 2021-01-21
Payer: COMMERCIAL

## 2021-01-26 ENCOUNTER — HOME CARE VISIT (OUTPATIENT)
Dept: HOSPICE | Facility: HOSPICE | Age: 51
End: 2021-01-26
Payer: COMMERCIAL